# Patient Record
Sex: FEMALE | Race: WHITE | NOT HISPANIC OR LATINO | Employment: FULL TIME | URBAN - METROPOLITAN AREA
[De-identification: names, ages, dates, MRNs, and addresses within clinical notes are randomized per-mention and may not be internally consistent; named-entity substitution may affect disease eponyms.]

---

## 2024-06-14 ENCOUNTER — TELEPHONE (OUTPATIENT)
Dept: ENDOCRINOLOGY | Facility: CLINIC | Age: 49
End: 2024-06-14

## 2024-06-14 NOTE — TELEPHONE ENCOUNTER
Two thyroid meds were called in she is not sure which one to take, would like a call back from clinical

## 2024-07-09 ENCOUNTER — HOSPITAL ENCOUNTER (OUTPATIENT)
Dept: MAMMOGRAPHY | Facility: HOSPITAL | Age: 49
Discharge: HOME | End: 2024-07-09
Admitting: OBSTETRICS & GYNECOLOGY
Payer: COMMERCIAL

## 2024-07-09 DIAGNOSIS — Z01.419 WOMEN'S ANNUAL ROUTINE GYNECOLOGICAL EXAMINATION: ICD-10-CM

## 2024-07-09 PROCEDURE — 77067 SCR MAMMO BI INCL CAD: CPT | Performed by: RADIOLOGY

## 2024-07-09 PROCEDURE — 77063 BREAST TOMOSYNTHESIS BI: CPT | Performed by: RADIOLOGY

## 2024-07-09 PROCEDURE — 77063 BREAST TOMOSYNTHESIS BI: CPT

## 2024-07-09 PROCEDURE — 77067 SCR MAMMO BI INCL CAD: CPT

## 2024-07-17 ENCOUNTER — HOSPITAL ENCOUNTER (OUTPATIENT)
Dept: MAMMOGRAPHY | Facility: HOSPITAL | Age: 49
Discharge: HOME | End: 2024-07-17
Admitting: RADIOLOGY
Payer: COMMERCIAL

## 2024-07-17 DIAGNOSIS — R92.8 ABNORMAL MAMMOGRAM: ICD-10-CM

## 2024-07-17 PROCEDURE — 77065 DX MAMMO INCL CAD UNI: CPT

## 2024-07-17 PROCEDURE — G0279 TOMOSYNTHESIS, MAMMO: HCPCS

## 2024-12-19 ENCOUNTER — OFFICE VISIT (OUTPATIENT)
Dept: ENDOCRINOLOGY | Facility: CLINIC | Age: 49
End: 2024-12-19
Payer: COMMERCIAL

## 2024-12-19 VITALS
OXYGEN SATURATION: 97 % | WEIGHT: 204 LBS | DIASTOLIC BLOOD PRESSURE: 92 MMHG | SYSTOLIC BLOOD PRESSURE: 150 MMHG | HEART RATE: 84 BPM | HEIGHT: 65 IN | BODY MASS INDEX: 33.99 KG/M2

## 2024-12-19 DIAGNOSIS — E78.00 PURE HYPERCHOLESTEROLEMIA: ICD-10-CM

## 2024-12-19 DIAGNOSIS — I10 ESSENTIAL HYPERTENSION: Primary | ICD-10-CM

## 2024-12-19 DIAGNOSIS — E03.9 ACQUIRED HYPOTHYROIDISM: ICD-10-CM

## 2024-12-19 LAB
ALBUMIN SERPL-MCNC: 4.3 G/DL (ref 3.5–5.2)
ALBUMIN/GLOB SERPL: 1.8 G/DL
ALP SERPL-CCNC: 103 U/L (ref 39–117)
ALT SERPL W P-5'-P-CCNC: 31 U/L (ref 1–33)
ANION GAP SERPL CALCULATED.3IONS-SCNC: 17.6 MMOL/L (ref 5–15)
AST SERPL-CCNC: 29 U/L (ref 1–32)
BILIRUB SERPL-MCNC: 0.4 MG/DL (ref 0–1.2)
BUN SERPL-MCNC: 5 MG/DL (ref 6–20)
BUN/CREAT SERPL: 4.3 (ref 7–25)
CALCIUM SPEC-SCNC: 9.6 MG/DL (ref 8.6–10.5)
CHLORIDE SERPL-SCNC: 108 MMOL/L (ref 98–107)
CHOLEST SERPL-MCNC: 206 MG/DL (ref 0–200)
CO2 SERPL-SCNC: 24.4 MMOL/L (ref 22–29)
CREAT SERPL-MCNC: 1.16 MG/DL (ref 0.57–1)
EGFRCR SERPLBLD CKD-EPI 2021: 58.6 ML/MIN/1.73
GLOBULIN UR ELPH-MCNC: 2.4 GM/DL
GLUCOSE SERPL-MCNC: 102 MG/DL (ref 65–99)
HDLC SERPL-MCNC: 29 MG/DL (ref 40–60)
LDLC SERPL CALC-MCNC: 132 MG/DL (ref 0–100)
LDLC/HDLC SERPL: 4.39 {RATIO}
POTASSIUM SERPL-SCNC: 3.5 MMOL/L (ref 3.5–5.2)
PROT SERPL-MCNC: 6.7 G/DL (ref 6–8.5)
SODIUM SERPL-SCNC: 150 MMOL/L (ref 136–145)
T4 FREE SERPL-MCNC: 1.35 NG/DL (ref 0.92–1.68)
TRIGL SERPL-MCNC: 249 MG/DL (ref 0–150)
TSH SERPL DL<=0.05 MIU/L-ACNC: 16.4 UIU/ML (ref 0.27–4.2)
VLDLC SERPL-MCNC: 45 MG/DL (ref 5–40)

## 2024-12-19 PROCEDURE — 84439 ASSAY OF FREE THYROXINE: CPT | Performed by: INTERNAL MEDICINE

## 2024-12-19 PROCEDURE — 80053 COMPREHEN METABOLIC PANEL: CPT | Performed by: INTERNAL MEDICINE

## 2024-12-19 PROCEDURE — 84443 ASSAY THYROID STIM HORMONE: CPT | Performed by: INTERNAL MEDICINE

## 2024-12-19 PROCEDURE — 84244 ASSAY OF RENIN: CPT | Performed by: INTERNAL MEDICINE

## 2024-12-19 PROCEDURE — 82088 ASSAY OF ALDOSTERONE: CPT | Performed by: INTERNAL MEDICINE

## 2024-12-19 PROCEDURE — 80061 LIPID PANEL: CPT | Performed by: INTERNAL MEDICINE

## 2024-12-19 RX ORDER — VALSARTAN 160 MG/1
160 TABLET ORAL
COMMUNITY
Start: 2024-11-25

## 2024-12-19 RX ORDER — METOPROLOL SUCCINATE 100 MG/1
100 TABLET, EXTENDED RELEASE ORAL
COMMUNITY
Start: 2024-10-07

## 2024-12-19 NOTE — ASSESSMENT & PLAN NOTE
Better with recheck- has f/u PCP - continue monitoring and medications  Home monitoring encouraged

## 2024-12-19 NOTE — PROGRESS NOTES
Jessica Caballero 49 y.o.  [facility]: follow up Hypertension, Hypothyroid, Hyperlipidemia    [place]: follow up Hypertension, Hypothyroid, Hyperlipidemia    BP is high today- recheck 144/82  Recent med adjustment - has f/u with Dr Anne  Room for adjustment diovan   High chol and TSH last visit  Is taking lipitor 20 mg daily and levothyroxine 150 mcg daily    No Known Allergies    Current Outpatient Medications:     atorvastatin (Lipitor) 20 MG tablet, Take 1 tablet by mouth Daily., Disp: 90 tablet, Rfl: 1    busPIRone (BUSPAR) 10 MG tablet, Take 2 tablets by mouth 3 (Three) Times a Day., Disp: , Rfl:     ferrous sulfate 140 (45 Fe) MG tablet controlled-release tablet, Take 1 tablet by mouth Daily With Breakfast., Disp: 30 tablet, Rfl: 3    lamoTRIgine (LaMICtal) 150 MG tablet, Take 1 tablet by mouth 2 (Two) Times a Day., Disp: , Rfl:     levothyroxine (Synthroid) 150 MCG tablet, Take 1 tablet by mouth Daily., Disp: 90 tablet, Rfl: 1    lithium (ESKALITH) 450 MG CR tablet, Take 1 tablet by mouth 2 (Two) Times a Day., Disp: , Rfl:     lithium carbonate 300 MG capsule, Take 1 capsule by mouth Daily., Disp: , Rfl:     metoprolol succinate XL (TOPROL-XL) 100 MG 24 hr tablet, 1 tablet., Disp: , Rfl:     omeprazole (priLOSEC) 40 MG capsule, Take one po q day, Disp: , Rfl:     Pediatric Multivitamins-Iron (Flintstones w/Iron) 18 MG chewable tablet, CHEW AND SWALLOW ONE TABLET BY MOUTH EVERY DAY, Disp: 30 tablet, Rfl: 5    valsartan (DIOVAN) 160 MG tablet, Take 1 tablet by mouth., Disp: , Rfl:     Vraylar 4.5 MG capsule, Take one po q day, Disp: , Rfl:     Patient Active Problem List    Diagnosis     Subacute frontal sinusitis [J01.10]     Pure hypercholesterolemia [E78.00]     Essential hypertension [I10]     Cough [R05.9]     Non-intractable cyclical vomiting without nausea [R11.15]     Medication intolerance [Z78.9]     Iron deficiency anemia [D50.9]     Acquired hypothyroidism [E03.9]     Chronic fatigue [R53.82]     Weight  "gain [R63.5]      Review of Systems   Constitutional:  Positive for activity change. Negative for appetite change and unexpected weight change.   HENT:  Negative for congestion and rhinorrhea.    Eyes:  Negative for visual disturbance.   Respiratory:  Negative for cough and shortness of breath.    Cardiovascular:  Negative for palpitations and leg swelling.   Gastrointestinal:  Negative for constipation, diarrhea and nausea.   Genitourinary:  Negative for hematuria.   Musculoskeletal:  Positive for arthralgias. Negative for back pain, gait problem, joint swelling and myalgias.   Skin:  Negative for color change, rash and wound.   Allergic/Immunologic: Negative for environmental allergies, food allergies and immunocompromised state.   Neurological:  Negative for dizziness, weakness and light-headedness.   Psychiatric/Behavioral:  Negative for confusion, decreased concentration, dysphoric mood and sleep disturbance. The patient is not nervous/anxious.      Social History     Socioeconomic History    Marital status:    Tobacco Use    Smoking status: Former     Current packs/day: 0.00     Types: Cigarettes     Quit date:      Years since quittin.9    Smokeless tobacco: Never   Vaping Use    Vaping status: Never Used   Substance and Sexual Activity    Alcohol use: Yes     Comment: 1 drink per night    Drug use: No    Sexual activity: Defer     Family History   Problem Relation Age of Onset    Hyperlipidemia Mother     Heart attack Father     Hypertension Father     Stroke Father     Thyroid disease Father     Aneurysm Father     Aneurysm Maternal Aunt     Aneurysm Maternal Grandmother     Breast cancer Maternal Grandmother 83    Ovarian cancer Neg Hx      /92 (BP Location: Left arm, Patient Position: Sitting, Cuff Size: Adult) Comment: pt reports chugging coffee right before visit  Pulse 84   Ht 165.1 cm (65\")   Wt 92.5 kg (204 lb)   LMP 2024 (Exact Date)   SpO2 97%   BMI 33.95 kg/m² "   Physical Exam  Vitals and nursing note reviewed.   Constitutional:       Appearance: Normal appearance. She is well-developed.   HENT:      Head: Normocephalic and atraumatic.   Eyes:      General: Lids are normal.      Conjunctiva/sclera: Conjunctivae normal.      Pupils: Pupils are equal, round, and reactive to light.   Neck:      Thyroid: No thyroid mass or thyromegaly.      Vascular: No carotid bruit.      Trachea: Trachea normal. No tracheal deviation.   Cardiovascular:      Rate and Rhythm: Normal rate and regular rhythm.      Heart sounds: Normal heart sounds. No murmur heard.     No friction rub. No gallop.   Pulmonary:      Effort: Pulmonary effort is normal. No respiratory distress.      Breath sounds: Normal breath sounds. No wheezing.   Musculoskeletal:         General: No deformity. Normal range of motion.      Cervical back: Normal range of motion and neck supple.   Lymphadenopathy:      Cervical: No cervical adenopathy.   Skin:     General: Skin is warm and dry.      Findings: No erythema or rash.      Nails: There is no clubbing.   Neurological:      General: No focal deficit present.      Mental Status: She is alert and oriented to person, place, and time.      Cranial Nerves: No cranial nerve deficit.      Deep Tendon Reflexes: Reflexes are normal and symmetric. Reflexes normal.   Psychiatric:         Speech: Speech normal.         Behavior: Behavior normal.         Thought Content: Thought content normal.         Judgment: Judgment normal.       Results for orders placed or performed in visit on 06/10/24   Comprehensive Metabolic Panel    Collection Time: 06/10/24  9:32 AM    Specimen: Blood   Result Value Ref Range    Glucose 98 65 - 99 mg/dL    BUN 5 (L) 6 - 20 mg/dL    Creatinine 1.07 (H) 0.57 - 1.00 mg/dL    Sodium 144 136 - 145 mmol/L    Potassium 3.3 (L) 3.5 - 5.2 mmol/L    Chloride 107 98 - 107 mmol/L    CO2 23.4 22.0 - 29.0 mmol/L    Calcium 9.6 8.6 - 10.5 mg/dL    Total Protein 6.6 6.0  - 8.5 g/dL    Albumin 4.1 3.5 - 5.2 g/dL    ALT (SGPT) 18 1 - 33 U/L    AST (SGOT) 16 1 - 32 U/L    Alkaline Phosphatase 101 39 - 117 U/L    Total Bilirubin 0.5 0.0 - 1.2 mg/dL    Globulin 2.5 gm/dL    A/G Ratio 1.6 g/dL    BUN/Creatinine Ratio 4.7 (L) 7.0 - 25.0    Anion Gap 13.6 5.0 - 15.0 mmol/L    eGFR 65.0 >60.0 mL/min/1.73   CBC Auto Differential    Collection Time: 06/10/24  9:32 AM    Specimen: Blood   Result Value Ref Range    WBC 9.89 3.40 - 10.80 10*3/mm3    RBC 4.36 3.77 - 5.28 10*6/mm3    Hemoglobin 12.4 12.0 - 15.9 g/dL    Hematocrit 36.7 34.0 - 46.6 %    MCV 84.2 79.0 - 97.0 fL    MCH 28.4 26.6 - 33.0 pg    MCHC 33.8 31.5 - 35.7 g/dL    RDW 13.6 12.3 - 15.4 %    RDW-SD 41.4 37.0 - 54.0 fl    MPV 10.2 6.0 - 12.0 fL    Platelets 385 140 - 450 10*3/mm3    Neutrophil % 76.5 (H) 42.7 - 76.0 %    Lymphocyte % 16.7 (L) 19.6 - 45.3 %    Monocyte % 5.4 5.0 - 12.0 %    Eosinophil % 0.2 (L) 0.3 - 6.2 %    Basophil % 0.7 0.0 - 1.5 %    Immature Grans % 0.5 0.0 - 0.5 %    Neutrophils, Absolute 7.57 (H) 1.70 - 7.00 10*3/mm3    Lymphocytes, Absolute 1.65 0.70 - 3.10 10*3/mm3    Monocytes, Absolute 0.53 0.10 - 0.90 10*3/mm3    Eosinophils, Absolute 0.02 0.00 - 0.40 10*3/mm3    Basophils, Absolute 0.07 0.00 - 0.20 10*3/mm3    Immature Grans, Absolute 0.05 0.00 - 0.05 10*3/mm3    nRBC 0.0 0.0 - 0.2 /100 WBC   Iron    Collection Time: 06/10/24  9:32 AM    Specimen: Blood   Result Value Ref Range    Iron 122 37 - 145 mcg/dL   Lipid Panel    Collection Time: 06/10/24  9:32 AM    Specimen: Blood   Result Value Ref Range    Total Cholesterol 213 (H) 0 - 200 mg/dL    Triglycerides 185 (H) 0 - 150 mg/dL    HDL Cholesterol 32 (L) 40 - 60 mg/dL    LDL Cholesterol  147 (H) 0 - 100 mg/dL    VLDL Cholesterol 34 5 - 40 mg/dL    LDL/HDL Ratio 4.50    TSH    Collection Time: 06/10/24  9:32 AM    Specimen: Blood   Result Value Ref Range    TSH 6.330 (H) 0.270 - 4.200 uIU/mL   T4, Free    Collection Time: 06/10/24  9:32 AM    Specimen:  Blood   Result Value Ref Range    Free T4 1.22 0.92 - 1.68 ng/dL   Vitamin D,25-Hydroxy    Collection Time: 06/10/24  9:32 AM    Specimen: Arm, Left; Blood   Result Value Ref Range    25 Hydroxy, Vitamin D 34.3 30.0 - 100.0 ng/ml     Diagnoses and all orders for this visit:    1. Essential hypertension (Primary)  Assessment & Plan:  Better with recheck- has f/u PCP - continue monitoring and medications  Home monitoring encouraged     Orders:  -     Aldosterone; Future  -     Renin Direct Assay; Future  -     Aldosterone  -     Renin Direct Assay    2. Acquired hypothyroidism  Assessment & Plan:  Taking lithium and synthroid 150 mcg daily   Check tfts     Orders:  -     T4, Free; Future  -     TSH; Future  -     Comprehensive Metabolic Panel; Future  -     Lipid Panel; Future  -     T4, Free  -     TSH  -     Comprehensive Metabolic Panel  -     Lipid Panel    3. Pure hypercholesterolemia  Assessment & Plan:  Update flp - taking lipitor 20 mg daily       Return in about 6 months (around 6/19/2025) for Recheck.    Electronically signed by: Lizbet Donnelly MD

## 2024-12-23 DIAGNOSIS — E03.9 ACQUIRED HYPOTHYROIDISM: Primary | ICD-10-CM

## 2024-12-23 RX ORDER — LEVOTHYROXINE SODIUM 150 UG/1
150 TABLET ORAL DAILY
Qty: 90 TABLET | Refills: 0 | OUTPATIENT
Start: 2024-12-23

## 2024-12-23 RX ORDER — LEVOTHYROXINE SODIUM 175 UG/1
175 TABLET ORAL DAILY
Qty: 90 TABLET | Refills: 1 | Status: SHIPPED | OUTPATIENT
Start: 2024-12-23

## 2024-12-23 NOTE — TELEPHONE ENCOUNTER
Rx Refill Note  Requested Prescriptions     Pending Prescriptions Disp Refills    levothyroxine (SYNTHROID, LEVOTHROID) 150 MCG tablet [Pharmacy Med Name: LEVOTHYROXIN 150MCG] 90 tablet 0     Sig: TAKE 1 TABLET BY MOUTH EVERY DAY      Last office visit with prescribing clinician: 12/19/2024     Next office visit with prescribing clinician: 6/19/2025     Savita Casey MA  12/23/24, 13:33 EST

## 2024-12-28 LAB — ALDOST SERPL-MCNC: 3.6 NG/DL (ref 0–30)

## 2024-12-30 LAB — RENIN PLAS-CCNC: 6.43 NG/ML/HR (ref 0.17–5.38)

## 2025-01-06 DIAGNOSIS — E03.9 ACQUIRED HYPOTHYROIDISM: Primary | ICD-10-CM

## 2025-01-06 RX ORDER — LEVOTHYROXINE SODIUM 200 UG/1
200 TABLET ORAL DAILY
Qty: 90 TABLET | Refills: 1 | Status: SHIPPED | OUTPATIENT
Start: 2025-01-06

## 2025-03-31 NOTE — TELEPHONE ENCOUNTER
Rx Refill Note  Requested Prescriptions     Pending Prescriptions Disp Refills    atorvastatin (LIPITOR) 20 MG tablet [Pharmacy Med Name: ATORVASTATIN 20MG] 90 tablet 0     Sig: TAKE 1 TABLET BY MOUTH ONCE DAILY      Last office visit with prescribing clinician: 12/19/2024     Next office visit with prescribing clinician: 6/19/2025     Savita Casey MA  03/31/25, 14:51 EDT

## 2025-04-01 RX ORDER — ATORVASTATIN CALCIUM 20 MG/1
20 TABLET, FILM COATED ORAL DAILY
Qty: 90 TABLET | Refills: 0 | Status: SHIPPED | OUTPATIENT
Start: 2025-04-01

## 2025-05-19 RX ORDER — VALSARTAN 320 MG/1
320 TABLET ORAL DAILY
Qty: 90 TABLET | Refills: 0 | Status: SHIPPED | OUTPATIENT
Start: 2025-05-19 | End: 2025-05-19 | Stop reason: SDUPTHER

## 2025-05-19 RX ORDER — VALSARTAN 320 MG/1
320 TABLET ORAL DAILY
Qty: 90 TABLET | Refills: 0 | Status: SHIPPED | OUTPATIENT
Start: 2025-05-19

## 2025-05-19 NOTE — TELEPHONE ENCOUNTER
Rx Refill Note  Requested Prescriptions     Pending Prescriptions Disp Refills    valsartan (DIOVAN) 320 MG tablet [Pharmacy Med Name: VALSARTAN 320MG TABS] 90 tablet 1     Sig: TAKE 1 TABLET DAILY      Last office visit with prescribing clinician: 12/19/2024     Next office visit with prescribing clinician: 6/19/2025     Tasia Kilpatrick MA  05/19/25, 09:44 EDT

## 2025-05-19 NOTE — TELEPHONE ENCOUNTER
Transmission failed.  Resent refill.  Left message for pt that she has 2 doses on her med list - please contact the office if she is not taking 320mg dose (which is what we sent)

## 2025-05-20 ENCOUNTER — OFFICE VISIT (OUTPATIENT)
Dept: OBSTETRICS AND GYNECOLOGY | Facility: CLINIC | Age: 50
End: 2025-05-20
Payer: COMMERCIAL

## 2025-05-20 VITALS
HEIGHT: 65 IN | DIASTOLIC BLOOD PRESSURE: 86 MMHG | WEIGHT: 202.4 LBS | SYSTOLIC BLOOD PRESSURE: 120 MMHG | BODY MASS INDEX: 33.72 KG/M2

## 2025-05-20 DIAGNOSIS — Z12.39 ENCOUNTER FOR SCREENING FOR MALIGNANT NEOPLASM OF BREAST, UNSPECIFIED SCREENING MODALITY: Primary | ICD-10-CM

## 2025-05-20 DIAGNOSIS — Z01.419 WOMEN'S ANNUAL ROUTINE GYNECOLOGICAL EXAMINATION: ICD-10-CM

## 2025-05-20 NOTE — PROGRESS NOTES
Gynecologic Annual Exam Note          GYN Annual Exam     Gynecologic Exam (Annual)        Subjective     HPI  Jessica Caballero is a 49 y.o. female, , who presents for annual well woman exam as a established patient. There were no changes to her medical or surgical history since her last visit..  Patient's last menstrual period was 2025.  Her periods occur every 25-30 days, lasting 7 days.  The flow is light. She denies dysmenorrhea. Marital Status: . She is sexually active. She has not had new partners.. STD testing recommendations have been explained to the patient and she declines STD testing.    The patient would like to discuss the following complaints today: none    Additional OB/GYN History   contraceptive methods: Tubal ligation  Desires to: do not start contraception  History of migraines: no    Last Pap : 5/10/2022. Result: negative. HPV: negative.   Last Completed Pap Smear    This patient has no relevant Health Maintenance data.       History of abnormal Pap smear: yes - many years ago  Family history of uterine, colon, breast, or ovarian cancer: no  Performs monthly Self-Breast Exam: yes  Last mammogram: 2024. Done at . There is a copy in the chart.    Last Completed Mammogram            Awaiting Completion       MAMMOGRAM (Every 2 Years) Order placed this encounter      2025  Order placed for Mammo Screening Digital Tomosynthesis Bilateral With CAD by Tamiko Clifford RN    2024  Mammo Diagnostic Digital Tomosynthesis Right With CAD    2024  Mammo Screening Digital Tomosynthesis Bilateral With CAD    2023  Mammo Diagnostic Digital Tomosynthesis Bilateral With CAD    2022  Mammo Screening Digital Tomosynthesis Bilateral With CAD     Only the first 5 history entries have been loaded, but more history exists.                          Colonoscopy: has never had a colonoscopy or cologuard  Exercises Regularly: no  Feelings of Anxiety or  Depression: yes - treated with medication   Tobacco Usage?: No       Current Outpatient Medications:     atorvastatin (LIPITOR) 20 MG tablet, TAKE 1 TABLET BY MOUTH ONCE DAILY, Disp: 90 tablet, Rfl: 0    busPIRone (BUSPAR) 10 MG tablet, Take 2 tablets by mouth 3 (Three) Times a Day., Disp: , Rfl:     ferrous sulfate 140 (45 Fe) MG tablet controlled-release tablet, Take 1 tablet by mouth Daily With Breakfast., Disp: 30 tablet, Rfl: 3    lamoTRIgine (LaMICtal) 150 MG tablet, Take 1 tablet by mouth 2 (Two) Times a Day., Disp: , Rfl:     levothyroxine (Synthroid) 200 MCG tablet, Take 1 tablet by mouth Daily., Disp: 90 tablet, Rfl: 1    lithium (ESKALITH) 450 MG CR tablet, Take 1 tablet by mouth 2 (Two) Times a Day., Disp: , Rfl:     lithium carbonate 300 MG capsule, Take 1 capsule by mouth Daily., Disp: , Rfl:     metoprolol succinate XL (TOPROL-XL) 100 MG 24 hr tablet, 1 tablet., Disp: , Rfl:     omeprazole (priLOSEC) 40 MG capsule, Take one po q day, Disp: , Rfl:     Pediatric Multivitamins-Iron (Flintstones w/Iron) 18 MG chewable tablet, CHEW AND SWALLOW ONE TABLET BY MOUTH EVERY DAY, Disp: 30 tablet, Rfl: 5    valsartan (DIOVAN) 160 MG tablet, Take 1 tablet by mouth., Disp: , Rfl:     valsartan (DIOVAN) 320 MG tablet, Take 1 tablet by mouth Daily., Disp: 90 tablet, Rfl: 0    Vraylar 4.5 MG capsule, Take one po q day, Disp: , Rfl:      Patient denies the need for medication refills today.    OB History          2    Para   2    Term   2            AB        Living   3         SAB        IAB        Ectopic        Molar        Multiple        Live Births   2                Past Medical History:   Diagnosis Date    Femur fracture     Kidney stone     Shingles rash 2017        Past Surgical History:   Procedure Laterality Date    CERVIX LESION DESTRUCTION      FEMUR FRACTURE SURGERY      TUBAL ABDOMINAL LIGATION         Health Maintenance   Topic Date Due    TDAP/TD VACCINES (1 - Tdap) Never done  "   HEPATITIS C SCREENING  Never done    ANNUAL PHYSICAL  Never done    COLORECTAL CANCER SCREENING  Never done    COVID-19 Vaccine (3 - 2024-25 season) 09/01/2024    PAP SMEAR  05/10/2025    Annual Gynecologic Pelvic and Breast Exam  05/17/2025    INFLUENZA VACCINE  07/01/2025    LIPID PANEL  12/19/2025    MAMMOGRAM  07/17/2026    Pneumococcal Vaccine 0-49  Aged Out       The additional following portions of the patient's history were reviewed and updated as appropriate: allergies, current medications, past family history, past medical history, past social history, past surgical history, and problem list.    Review of Systems   Constitutional: Negative.    HENT: Negative.     Eyes: Negative.    Respiratory: Negative.     Cardiovascular: Negative.    Gastrointestinal: Negative.    Endocrine: Negative.    Genitourinary: Negative.    Musculoskeletal: Negative.    Skin: Negative.    Allergic/Immunologic: Negative.    Neurological: Negative.    Hematological: Negative.    Psychiatric/Behavioral: Negative.           I have reviewed and agree with the HPI, ROS, and historical information as entered above. Herminia Nicole MD          Objective   /86   Ht 165.1 cm (65\")   Wt 91.8 kg (202 lb 6.4 oz)   LMP 05/07/2025   BMI 33.68 kg/m²     Physical Exam  Vitals and nursing note reviewed. Exam conducted with a chaperone present.   Constitutional:       Appearance: She is well-developed.   HENT:      Head: Normocephalic and atraumatic.   Eyes:      Pupils: Pupils are equal, round, and reactive to light.   Neck:      Thyroid: No thyroid mass or thyromegaly.   Pulmonary:      Effort: Pulmonary effort is normal. No retractions.   Chest:      Chest wall: No mass.   Breasts:     Right: Normal. No mass, nipple discharge, skin change or tenderness.      Left: Normal. No mass, nipple discharge, skin change or tenderness.   Abdominal:      General: Bowel sounds are normal.      Palpations: Abdomen is soft. Abdomen is not rigid. " There is no mass.      Tenderness: There is no abdominal tenderness. There is no guarding.      Hernia: No hernia is present. There is no hernia in the left inguinal area or right inguinal area.   Genitourinary:     Exam position: Lithotomy position.      Pubic Area: No rash.       Labia:         Right: No rash, tenderness or lesion.         Left: No rash, tenderness or lesion.       Urethra: No urethral pain or urethral swelling.      Vagina: Normal. No vaginal discharge or lesions.      Cervix: No cervical motion tenderness, discharge, lesion or cervical bleeding.      Uterus: Normal. Not enlarged, not fixed and not tender.       Adnexa:         Right: No mass, tenderness or fullness.          Left: No mass, tenderness or fullness.        Rectum: No external hemorrhoid.   Musculoskeletal:      Cervical back: Normal range of motion. No muscular tenderness.      Right lower leg: No edema.      Left lower leg: No edema.   Skin:     General: Skin is warm and dry.   Neurological:      Mental Status: She is alert and oriented to person, place, and time.      Motor: Motor function is intact.   Psychiatric:         Mood and Affect: Mood and affect normal.         Behavior: Behavior normal.            Assessment and Plan    Problem List Items Addressed This Visit    None  Visit Diagnoses         Encounter for screening for malignant neoplasm of breast, unspecified screening modality    -  Primary    Relevant Orders    Mammo Screening Digital Tomosynthesis Bilateral With CAD      Women's annual routine gynecological examination        Relevant Orders    LIQUID-BASED PAP SMEAR WITH HPV GENOTYPING REGARDLESS OF INTERPRETATION ([facility])    Mammo Screening Digital Tomosynthesis Bilateral With CAD            GYN annual well woman exam.   Pap guidelines reviewed.  Reviewed monthly self breast exams.  Instructed to call with lumps, pain, or breast discharge.    Ordered Mammogram today  Reviewed exercise as a preventative  health measures.   Return in about 1 year (around 5/20/2026) for Annual physical.     Herminia Nicole MD  05/20/2025

## 2025-05-23 LAB — REF LAB TEST METHOD: NORMAL

## 2025-05-30 RX ORDER — VALSARTAN 320 MG/1
320 TABLET ORAL DAILY
Qty: 90 TABLET | Refills: 0 | Status: SHIPPED | OUTPATIENT
Start: 2025-05-30

## 2025-05-30 NOTE — TELEPHONE ENCOUNTER
Rx Refill Note  Requested Prescriptions     Pending Prescriptions Disp Refills    valsartan (DIOVAN) 320 MG tablet 90 tablet 0     Sig: Take 1 tablet by mouth Daily.      Last office visit with prescribing clinician: 12/19/2024     Next office visit with prescribing clinician: 6/19/2025

## 2025-06-17 ENCOUNTER — OFFICE VISIT (OUTPATIENT)
Dept: OBSTETRICS AND GYNECOLOGY | Facility: CLINIC | Age: 50
End: 2025-06-17
Payer: COMMERCIAL

## 2025-06-17 VITALS
BODY MASS INDEX: 34.05 KG/M2 | DIASTOLIC BLOOD PRESSURE: 86 MMHG | WEIGHT: 204.4 LBS | HEIGHT: 65 IN | SYSTOLIC BLOOD PRESSURE: 134 MMHG

## 2025-06-17 DIAGNOSIS — Z87.42 HISTORY OF ABNORMAL CERVICAL PAP SMEAR: Primary | ICD-10-CM

## 2025-06-17 DIAGNOSIS — R87.610 ASCUS WITH POSITIVE HIGH RISK HPV CERVICAL: ICD-10-CM

## 2025-06-17 DIAGNOSIS — R87.810 ASCUS WITH POSITIVE HIGH RISK HPV CERVICAL: ICD-10-CM

## 2025-06-17 LAB
B-HCG UR QL: NEGATIVE
EXPIRATION DATE: NORMAL
INTERNAL NEGATIVE CONTROL: NORMAL
INTERNAL POSITIVE CONTROL: NORMAL
Lab: NORMAL

## 2025-06-17 NOTE — PROGRESS NOTES
"     Colposcopy Procedure Note        Procedures    Indications: Jessica Caballero is a 49 y.o. female, , whose Patient's last menstrual period was 2025..  She presents for follow up for evaluation of an abnormal PAP smear that showed ASCUS HPV pool positive on 2025. Prior cervical treatment includes: no treatment. She understands the need for the procedure and is aware of the complications, including post-colposcopic vaginal bleeding, vaginal leukorrhea or cervicitis.  She is aware she may experience discomfort.  After being presented with the risk, benefits, and alternatives the patient wished to proceed.      Urine pregnancy test done in the office today was Negative.      The patient has not had Gardasil.  She is not a smoker.      Procedure Details   The risks and benefits of the procedure and Verbal informed consent obtained. Time out: immediate members of the procedure team and patient agree to the following: correct patient, correct site, correct procedure to be performed. Herminia Nicole MD      She was positioned in the dorsal lithotomy position and a speculum was inserted into the vagina and excellent visualization of cervix achieved, cervix swabbed x 3 with acetic acid solution. The transformation zone was completely visualized.  A cervical biopsy was obtained at 7 and 12 o'clock.  An endocervical curettage was performed.  This colposcopy was satisfactory.     Findings:  The procedure was notable for:  Physical Exam  /86   Ht 165.1 cm (65\")   Wt 92.7 kg (204 lb 6.4 oz)   LMP 2025   BMI 34.01 kg/m²       Specimens: cervical biopsies  Specimens labelled and sent to Pathology.    Complications: none.    The patient tolerated the procedure well and with mild discomfort and bleeding.    Assessment and Plan    Problem List Items Addressed This Visit    None  Visit Diagnoses         History of abnormal cervical Pap smear    -  Primary    Relevant Orders    POC Pregnancy, Urine " (Completed)            Will base further treatment on Pathology findings.  Treatment options discussed with patient.  Post biopsy instructions given to patient.      Herminia Nicole MD  06/17/2025

## 2025-06-18 LAB — REF LAB TEST METHOD: NORMAL

## 2025-06-19 ENCOUNTER — OFFICE VISIT (OUTPATIENT)
Dept: ENDOCRINOLOGY | Facility: CLINIC | Age: 50
End: 2025-06-19
Payer: COMMERCIAL

## 2025-06-19 VITALS
HEIGHT: 65 IN | HEART RATE: 78 BPM | WEIGHT: 203 LBS | DIASTOLIC BLOOD PRESSURE: 78 MMHG | SYSTOLIC BLOOD PRESSURE: 128 MMHG | BODY MASS INDEX: 33.82 KG/M2

## 2025-06-19 DIAGNOSIS — I10 ESSENTIAL HYPERTENSION: ICD-10-CM

## 2025-06-19 DIAGNOSIS — E55.9 VITAMIN D DEFICIENCY: ICD-10-CM

## 2025-06-19 DIAGNOSIS — E03.9 ACQUIRED HYPOTHYROIDISM: ICD-10-CM

## 2025-06-19 DIAGNOSIS — R73.9 ELEVATED BLOOD SUGAR: ICD-10-CM

## 2025-06-19 DIAGNOSIS — E78.00 PURE HYPERCHOLESTEROLEMIA: Primary | ICD-10-CM

## 2025-06-19 LAB
25(OH)D3 SERPL-MCNC: 38.2 NG/ML (ref 30–100)
ALBUMIN SERPL-MCNC: 4 G/DL (ref 3.5–5.2)
ALBUMIN/GLOB SERPL: 1.5 G/DL
ALP SERPL-CCNC: 117 U/L (ref 39–117)
ALT SERPL W P-5'-P-CCNC: 24 U/L (ref 1–33)
ANION GAP SERPL CALCULATED.3IONS-SCNC: 11 MMOL/L (ref 5–15)
AST SERPL-CCNC: 22 U/L (ref 1–32)
BILIRUB SERPL-MCNC: 0.4 MG/DL (ref 0–1.2)
BUN SERPL-MCNC: 6 MG/DL (ref 6–20)
BUN/CREAT SERPL: 5.3 (ref 7–25)
CALCIUM SPEC-SCNC: 9.7 MG/DL (ref 8.6–10.5)
CHLORIDE SERPL-SCNC: 109 MMOL/L (ref 98–107)
CHOLEST SERPL-MCNC: 162 MG/DL (ref 0–200)
CO2 SERPL-SCNC: 25 MMOL/L (ref 22–29)
CREAT SERPL-MCNC: 1.14 MG/DL (ref 0.57–1)
EGFRCR SERPLBLD CKD-EPI 2021: 59.9 ML/MIN/1.73
GLOBULIN UR ELPH-MCNC: 2.6 GM/DL
GLUCOSE SERPL-MCNC: 103 MG/DL (ref 65–99)
HBA1C MFR BLD: 5 % (ref 4.8–5.6)
HDLC SERPL-MCNC: 28 MG/DL (ref 40–60)
LDLC SERPL CALC-MCNC: 98 MG/DL (ref 0–100)
LDLC/HDLC SERPL: 3.3 {RATIO}
LITHIUM SERPL-SCNC: 0.9 MMOL/L (ref 0.6–1.2)
POTASSIUM SERPL-SCNC: 3.4 MMOL/L (ref 3.5–5.2)
PROT SERPL-MCNC: 6.6 G/DL (ref 6–8.5)
SODIUM SERPL-SCNC: 145 MMOL/L (ref 136–145)
T4 FREE SERPL-MCNC: 1.38 NG/DL (ref 0.92–1.68)
TRIGL SERPL-MCNC: 208 MG/DL (ref 0–150)
TSH SERPL DL<=0.05 MIU/L-ACNC: 0.04 UIU/ML (ref 0.27–4.2)
VLDLC SERPL-MCNC: 36 MG/DL (ref 5–40)

## 2025-06-19 PROCEDURE — 83036 HEMOGLOBIN GLYCOSYLATED A1C: CPT | Performed by: INTERNAL MEDICINE

## 2025-06-19 PROCEDURE — 84443 ASSAY THYROID STIM HORMONE: CPT | Performed by: INTERNAL MEDICINE

## 2025-06-19 PROCEDURE — 82306 VITAMIN D 25 HYDROXY: CPT | Performed by: INTERNAL MEDICINE

## 2025-06-19 PROCEDURE — 80053 COMPREHEN METABOLIC PANEL: CPT | Performed by: INTERNAL MEDICINE

## 2025-06-19 PROCEDURE — 80061 LIPID PANEL: CPT | Performed by: INTERNAL MEDICINE

## 2025-06-19 PROCEDURE — 84439 ASSAY OF FREE THYROXINE: CPT | Performed by: INTERNAL MEDICINE

## 2025-06-19 PROCEDURE — 80178 ASSAY OF LITHIUM: CPT | Performed by: INTERNAL MEDICINE

## 2025-06-19 NOTE — PROGRESS NOTES
Jessica Caballero 49 y.o.  CC: follow up Hypertension, Hypothyroid, hyperlipidemia    [facility]: follow up Hypertension, Hypothyroid, Hyperlipidemia    BP is good taking toprol and diovan   Is taking synthroid 200 mcg daily   Is eating low fat diet and taking lipitor 20 mg daily     No Known Allergies    Current Outpatient Medications:     atorvastatin (LIPITOR) 20 MG tablet, TAKE 1 TABLET BY MOUTH ONCE DAILY, Disp: 90 tablet, Rfl: 0    busPIRone (BUSPAR) 10 MG tablet, Take 2 tablets by mouth 3 (Three) Times a Day., Disp: , Rfl:     ferrous sulfate 140 (45 Fe) MG tablet controlled-release tablet, Take 1 tablet by mouth Daily With Breakfast., Disp: 30 tablet, Rfl: 3    lamoTRIgine (LaMICtal) 150 MG tablet, Take 1 tablet by mouth 2 (Two) Times a Day., Disp: , Rfl:     levothyroxine (Synthroid) 200 MCG tablet, Take 1 tablet by mouth Daily., Disp: 90 tablet, Rfl: 1    lithium (ESKALITH) 450 MG CR tablet, Take 1 tablet by mouth 2 (Two) Times a Day., Disp: , Rfl:     lithium carbonate 300 MG capsule, Take 1 capsule by mouth Daily., Disp: , Rfl:     metoprolol succinate XL (TOPROL-XL) 100 MG 24 hr tablet, 1 tablet., Disp: , Rfl:     omeprazole (priLOSEC) 40 MG capsule, Take one po q day, Disp: , Rfl:     Pediatric Multivitamins-Iron (Flintstones w/Iron) 18 MG chewable tablet, CHEW AND SWALLOW ONE TABLET BY MOUTH EVERY DAY, Disp: 30 tablet, Rfl: 5    valsartan (DIOVAN) 320 MG tablet, Take 1 tablet by mouth Daily., Disp: 90 tablet, Rfl: 0    Vraylar 4.5 MG capsule, Take one po q day, Disp: , Rfl:   Patient Active Problem List    Diagnosis     Subacute frontal sinusitis [J01.10]     Pure hypercholesterolemia [E78.00]     Essential hypertension [I10]     Cough [R05.9]     Non-intractable cyclical vomiting without nausea [R11.15]     Medication intolerance [Z78.9]     Iron deficiency anemia [D50.9]     Acquired hypothyroidism [E03.9]     Chronic fatigue [R53.82]     Weight gain [R63.5]      Review of Systems   Constitutional:   "Negative for activity change, appetite change and unexpected weight change.   HENT:  Negative for congestion and rhinorrhea.    Eyes:  Negative for visual disturbance.   Respiratory:  Negative for cough and shortness of breath.    Cardiovascular:  Negative for palpitations and leg swelling.   Gastrointestinal:  Negative for constipation, diarrhea and nausea.   Genitourinary:  Negative for hematuria.   Musculoskeletal:  Negative for arthralgias, back pain, gait problem, joint swelling and myalgias.   Skin:  Negative for color change, rash and wound.   Allergic/Immunologic: Negative for environmental allergies, food allergies and immunocompromised state.   Neurological:  Negative for dizziness, weakness and light-headedness.   Psychiatric/Behavioral:  Negative for confusion, decreased concentration, dysphoric mood and sleep disturbance. The patient is not nervous/anxious.      Social History     Socioeconomic History    Marital status:    Tobacco Use    Smoking status: Former     Current packs/day: 0.00     Types: Cigarettes     Quit date: 2010     Years since quitting: 15.4    Smokeless tobacco: Never   Vaping Use    Vaping status: Never Used   Substance and Sexual Activity    Alcohol use: Yes     Comment: 1 drink per night    Drug use: No    Sexual activity: Defer     Family History   Problem Relation Age of Onset    Hyperlipidemia Mother     Heart attack Father     Hypertension Father     Stroke Father     Thyroid disease Father     Aneurysm Father     Aneurysm Maternal Aunt     Aneurysm Maternal Grandmother     Breast cancer Maternal Grandmother 83    Ovarian cancer Neg Hx      /78   Pulse 78   Ht 165.1 cm (65\")   Wt 92.1 kg (203 lb)   LMP 05/07/2025   BMI 33.78 kg/m²   Physical Exam  Vitals and nursing note reviewed.   Constitutional:       Appearance: Normal appearance. She is well-developed.   HENT:      Head: Normocephalic and atraumatic.   Eyes:      General: Lids are normal.      " Extraocular Movements: Extraocular movements intact.      Conjunctiva/sclera: Conjunctivae normal.      Pupils: Pupils are equal, round, and reactive to light.   Neck:      Thyroid: No thyroid mass or thyromegaly.      Vascular: No carotid bruit.      Trachea: Trachea normal. No tracheal deviation.   Cardiovascular:      Rate and Rhythm: Normal rate and regular rhythm.      Pulses: Normal pulses.      Heart sounds: Normal heart sounds. No murmur heard.     No friction rub. No gallop.   Pulmonary:      Effort: Pulmonary effort is normal. No respiratory distress.      Breath sounds: Normal breath sounds. No wheezing.   Musculoskeletal:         General: No deformity. Normal range of motion.      Cervical back: Normal range of motion and neck supple.   Lymphadenopathy:      Cervical: No cervical adenopathy.   Skin:     General: Skin is warm and dry.      Findings: No erythema or rash.      Nails: There is no clubbing.   Neurological:      General: No focal deficit present.      Mental Status: She is alert and oriented to person, place, and time.      Cranial Nerves: No cranial nerve deficit.      Deep Tendon Reflexes: Reflexes are normal and symmetric. Reflexes normal.   Psychiatric:         Speech: Speech normal.         Behavior: Behavior normal.         Thought Content: Thought content normal.         Judgment: Judgment normal.       Results for orders placed or performed in visit on 06/19/25   Lithium Level    Collection Time: 06/19/25  9:26 AM    Specimen: Arm, Left; Blood   Result Value Ref Range    Lithium 0.9 0.6 - 1.2 mmol/L     Diagnoses and all orders for this visit:    1. Pure hypercholesterolemia (Primary)  Assessment & Plan:  Continue low fat diet - taking lipitor 20 mg daily     Orders:  -     Comprehensive Metabolic Panel; Future  -     Lipid Panel; Future  -     Comprehensive Metabolic Panel  -     Lipid Panel    2. Essential hypertension  Assessment & Plan:  BP is good- continue monitoring and toprol,  diovan     Orders:  -     Lithium Level; Future  -     Lithium Level    3. Acquired hypothyroidism  Assessment & Plan:  Taking synthroid 200 mcg daily   Check tfts     Orders:  -     T4, Free; Future  -     TSH; Future  -     T4, Free  -     TSH    4. Elevated blood sugar  -     Hemoglobin A1c; Future  -     Hemoglobin A1c    5. Vitamin D deficiency  -     Vitamin D,25-Hydroxy; Future  -     Vitamin D,25-Hydroxy    Return in about 6 months (around 12/19/2025) for Recheck.    Electronically signed by: Lizbet Donnelly MD

## 2025-06-25 ENCOUNTER — RESULTS FOLLOW-UP (OUTPATIENT)
Dept: ENDOCRINOLOGY | Facility: CLINIC | Age: 50
End: 2025-06-25
Payer: COMMERCIAL

## 2025-06-25 RX ORDER — LEVOTHYROXINE SODIUM 175 UG/1
175 TABLET ORAL
Qty: 90 TABLET | Refills: 1 | Status: SHIPPED | OUTPATIENT
Start: 2025-06-25

## 2025-06-25 RX ORDER — POTASSIUM CHLORIDE 750 MG/1
10 TABLET, EXTENDED RELEASE ORAL DAILY
Qty: 90 TABLET | Refills: 1 | Status: SHIPPED | OUTPATIENT
Start: 2025-06-25 | End: 2026-06-25

## 2025-06-25 NOTE — LETTER
Jessica Caballero  [address] [facility] [address]    June 25, 2025     Dear Ms. Caballero:    Below are the results from your recent visit:    Resulted Orders   T4, Free   Result Value Ref Range    Free T4 1.38 0.92 - 1.68 ng/dL   TSH   Result Value Ref Range    TSH 0.037 (L) 0.270 - 4.200 uIU/mL   Comprehensive Metabolic Panel   Result Value Ref Range    Glucose 103 (H) 65 - 99 mg/dL    BUN 6.0 6.0 - 20.0 mg/dL    Creatinine 1.14 (H) 0.57 - 1.00 mg/dL    Sodium 145 136 - 145 mmol/L    Potassium 3.4 (L) 3.5 - 5.2 mmol/L    Chloride 109 (H) 98 - 107 mmol/L    CO2 25.0 22.0 - 29.0 mmol/L    Calcium 9.7 8.6 - 10.5 mg/dL    Total Protein 6.6 6.0 - 8.5 g/dL    Albumin 4.0 3.5 - 5.2 g/dL    ALT (SGPT) 24 1 - 33 U/L    AST (SGOT) 22 1 - 32 U/L    Alkaline Phosphatase 117 39 - 117 U/L    Total Bilirubin 0.4 0.0 - 1.2 mg/dL    Globulin 2.6 gm/dL    A/G Ratio 1.5 g/dL    BUN/Creatinine Ratio 5.3 (L) 7.0 - 25.0    Anion Gap 11.0 5.0 - 15.0 mmol/L    eGFR 59.9 (L) >60.0 mL/min/1.73   Lipid Panel   Result Value Ref Range    Total Cholesterol 162 0 - 200 mg/dL    Triglycerides 208 (H) 0 - 150 mg/dL    HDL Cholesterol 28 (L) 40 - 60 mg/dL    LDL Cholesterol  98 0 - 100 mg/dL    VLDL Cholesterol 36 5 - 40 mg/dL    LDL/HDL Ratio 3.30    Hemoglobin A1c   Result Value Ref Range    Hemoglobin A1C 5.00 4.80 - 5.60 %   Lithium Level   Result Value Ref Range    Lithium 0.9 0.6 - 1.2 mmol/L   Vitamin D,25-Hydroxy   Result Value Ref Range    25 Hydroxy, Vitamin D 38.2 30.0 - 100.0 ng/ml     TSH is low- reduce thyroid supplement to 175 mcg daily and we will repeat thyroid tests in 8 weeks  Kidney function is mildly impaired but stable compared to prior testing- be sure you are drinking plenty of fluids  Triglycerides are high - follow a low fat diet  Potassium is low- add a low dose supplement for this  Lithium level is normal     If you have any questions or concerns, please don't hesitate to call.          Sincerely,        Lizbet Donnelly MD

## 2025-07-21 ENCOUNTER — HOSPITAL ENCOUNTER (OUTPATIENT)
Dept: MAMMOGRAPHY | Facility: HOSPITAL | Age: 50
Discharge: HOME | End: 2025-07-21
Admitting: OBSTETRICS & GYNECOLOGY
Payer: COMMERCIAL

## 2025-07-21 DIAGNOSIS — Z01.419 WOMEN'S ANNUAL ROUTINE GYNECOLOGICAL EXAMINATION: ICD-10-CM

## 2025-07-21 LAB
NCCN CRITERIA FLAG: NORMAL
TYRER CUZICK SCORE: 7.4

## 2025-07-21 PROCEDURE — 77063 BREAST TOMOSYNTHESIS BI: CPT

## 2025-07-21 PROCEDURE — 77067 SCR MAMMO BI INCL CAD: CPT
